# Patient Record
Sex: FEMALE | Race: WHITE | Employment: OTHER | URBAN - METROPOLITAN AREA
[De-identification: names, ages, dates, MRNs, and addresses within clinical notes are randomized per-mention and may not be internally consistent; named-entity substitution may affect disease eponyms.]

---

## 2021-10-14 ENCOUNTER — HOSPITAL ENCOUNTER (OUTPATIENT)
Dept: WOUND CARE | Age: 81
Discharge: HOME | End: 2021-10-14
Attending: PODIATRIST
Payer: MEDICARE

## 2021-10-14 DIAGNOSIS — L08.9 POST-TRAUMATIC WOUND INFECTION: ICD-10-CM

## 2021-10-14 DIAGNOSIS — T14.8XXA POST-TRAUMATIC WOUND INFECTION: ICD-10-CM

## 2021-10-14 DIAGNOSIS — L97.423: Primary | ICD-10-CM

## 2021-10-14 PROCEDURE — 11042 DBRDMT SUBQ TIS 1ST 20SQCM/<: CPT

## 2021-10-14 RX ORDER — LIDOCAINE HYDROCHLORIDE 20 MG/ML
JELLY TOPICAL ONCE
Status: CANCELLED | OUTPATIENT
Start: 2021-10-14 | End: 2021-10-14

## 2021-10-14 RX ORDER — LIDOCAINE HYDROCHLORIDE 20 MG/ML
JELLY TOPICAL ONCE
Status: DISPENSED | OUTPATIENT
Start: 2021-10-14 | End: 2021-10-14

## 2021-10-14 NOTE — PROGRESS NOTES
[address] [facility]  Initial Consult note    Subjective:     Chief Complaint:  Bette Finley is a 79 y.o.  female who presents with Lt. foot dorsal over the 3rd and 4th mets wound of 4 weeks duration. Referred by:  PMJUSTICE    HPI:   Hit the foot on a rocking chair at the [employer] in early september  Wound caused by: acute injury due to trauma   Current wound care:  Offloading wound: yes and n/a  Appetite: good  Wound associated pain: moderate  Diabetic: no  Smoker: no  ROS: no N/V, no T/chills; no local rash  No past medical history on file. No past surgical history on file. No family history on file. Social History     Tobacco Use    Smoking status: Not on file   Substance Use Topics    Alcohol use: Not on file       Prior to Admission medications    Not on File     Not on File     Review of Systems  Gen: No fever, chills, malaise, weight loss/gain. Derm: see above   Musc/skeletal: no bone or joint complains. Vasc: No edema, cyanosis or claudication. Endo: No heat/cold intolerance, no polyuria,polydipsia or polyphagia. HgbA1c:  Advance Care plan:     Counseling re nutritionnot done. Current meds documented in chart  Pain:     Counseling re smoking cessation not done. Blood pressure: noted below     Review of Systems:  Pertinent items are noted in the History of Present Illness. Objective:     Physical Exam:     There were no vitals taken for this visit. General: well developed, well nourished, pleasant , NAD. Hygiene good  Psych: cooperative. Pleasant. No anxiety or depression. Normal mood and affect. Neuro: alert and oriented to person/place/situation. Otherwise nonfocal.  Derm: Skin turgor for age, dry skin  Lower extremities: color normal; temperature normal.Hair growth is not present. Calves are supple, nontender, approximately equally sized in comparison.   Capillary refill <3 sec  Focussed Lower Extremity Exam:  Vascular exam & Nails dystrophic:  Left lower extremity: mild  edema, foot minimal,   DP pulse : 1+  PT pulse: 1+    Ulcer Location: Lt. foot dirsal over the 3 and 4 mets   Etiology: post traumatic  Wound Length:    Wound Width :   Wound Depth :   Ulcer bed: Mixed Granular/Fibrotic  Necrotic eschar  eschar   Hematoma  Perilesional and in the wound bed from trauma    Periwound: Indurated   Exudate: Scant/small amount Serosanguinous exudate  Depth of Wound: To Muscle      Data Review:   No results found for this or any previous visit (from the past 24 hour(s)). Assessment:   There is no problem list on file for this patient. 79 y.o. female with post traumatic wound left foot    Needs :  Serial debridement- debrided today- see note below  Good local wound care  Edema management  Nutrition optimization  Good Diabetic control    Plan:     In wound care clinic today:  Cleanse wound with NS or soap and water or commercial wound cleanser  Apply the following topically to wound bed:  Apply the following to noé-wound: NA  Apply the following dressings: Absorptive dressing    For Home Care/Self Care:  Cleanse wound with NS or soap and water or commercial wound cleanser  Keep dressing dry and intact when bathing  Apply the following to wound bed:  Apply the following to skin around wound: NA  Apply the following dressings: Absorptive dressing    Leave dressings in place until next visit    Edema Control:   Elevate legs as much as possible. Avoid standing in one position for more than 10 minutes. Avoid setting with legs down. Do not cross legs while sitting. Off-Loading:     Frequent position changes. Do not cross legs while sitting. Shift weight every 20 minutes or more when sitting for prolonged periods of time. Patient to return for wound care in:   Days  Follow up with Nurse visit as recommended. PLEASE CONTACT OFFICE AS SOON AS POSSIBLE IF UNABLE TO MAKE THIS APPOINTMENT.  Inspect your wounds, looking for signs of infection which may include the following:  Increase in redness  Red \"streaks\" from wound  Increase in swelling  Fever  Unusual odor  Change in the amount of wound drainage. Should you experience any significant changes in your wound(s) or have any questions regarding your home care instructions please contact the wound center or your home health company. If after regular business hours, please call your family doctor or local emergency room. Patient understood and agrees with plan. Questions answered. Signed By: Meena Donaldson DPM     October 14, 2021           Debridement Wound Care        Problem List Items Addressed This Visit     None          No orders of the defined types were placed in this encounter. Procedure Note  Indications:  Based on my examination of this patient's wound(s)/ulcer(s) today, debridement is required to promote healing and evaluate the wound base.     Performed by: Meena Donaldson DPM    Consent obtained: Yes    Time out taken: Yes    Debridement: Excisional    Using  Curette and pressure irrigation with peroxide in the wound bed follwed with NS the wound(s)/ulcer(s) was/were sharply debrided down through and including the removal of    epidermis, dermis, subcutaneous tissue and muscle/fascia    Devitalized Tissue Debrided: fibrin, biofilm, slough, necrotic/eschar and clots    Pre Debridement Measurements:  Are located in the [place] [facility]  Documentation Flow Sheet    Wound/Ulcer #: 1    Post Debridement Measurements:  Wound/Ulcer Descriptions are Pre Debridement except measurements: Other: post traumatic    Wound Foot Left;Dorsal 10/14/21 (Active)   Wound Image   10/14/21 0931   Wound Etiology Traumatic 10/14/21 0931   Dressing Status Intact 10/14/21 0931   Cleansed Wound cleanser 10/14/21 0931   Wound Length (cm) 0.9 cm 10/14/21 0931   Wound Width (cm) 0.6 cm 10/14/21 0931   Wound Depth (cm) 0.7 cm 10/14/21 0931   Wound Surface Area (cm^2) 0.54 cm^2 10/14/21 0931   Wound Volume (cm^3) 0.378 cm^3 10/14/21 0931   Drainage Amount Scant 10/14/21 0931   Wound Odor None 10/14/21 0931   Lashell-Wound/Incision Assessment Dry/flaky 10/14/21 0931   Edges Defined edges 10/14/21 0931   Wound Thickness Description Full thickness 10/14/21 0931   Number of days: 0        Percent of Wound(s)/Ulcer(s) Debrided: 100%    Total Surface Area Debrided:  Approx 1 sq cm See RN's note    Diabetic/Pressure/Non Pressure Ulcers only: Other: post traumatic  Ulcer:     Estimated Blood Loss:  Estimated amt of blood loss is 10 ml    Hemostasis Achieved: Pressure    Procedural Pain: 4 / 10     Post Procedural Pain: 0 / 10     Response to treatment: Patient tolerated treatment with mild discomfort but relieved after procedure was completed

## 2021-10-14 NOTE — WOUND CARE
10/14/21 0931   Wound Foot Left;Dorsal 10/14/21   Date First Assessed/Time First Assessed: 10/14/21 0930   Present on Hospital Admission: Yes  Wound Approximate Age at First Assessment (Weeks): 5 weeks  Primary Wound Type: Traumatic  Location: Foot  Wound Location Orientation: Left;Dorsal  Date of Fi. ..    Wound Image    Wound Etiology Traumatic   Dressing Status Intact   Cleansed Wound cleanser   Dressing/Treatment Silicone border  (iodoform packing)   Dressing Change Due 10/19/21   Wound Length (cm) 0.9 cm   Wound Width (cm) 0.6 cm   Wound Depth (cm) 0.7 cm   Wound Surface Area (cm^2) 0.54 cm^2   Wound Volume (cm^3) 0.378 cm^3   Post-Procedure Length (cm) 1 cm   Post-Procedure Width (cm) 0.6 cm   Post-Procedure Depth (cm) 0.9 cm   Post-Procedure Surface Area (cm^2) 0.6 cm^2   Post-Procedure Volume (cm^3) 0.54 cm^3   Wound Assessment Purple/maroon;Ruptured blister   Drainage Amount Scant   Drainage Description Other (Comment)  (clotted blood)   Wound Odor None   Lashell-Wound/Incision Assessment Dry/flaky   Edges Defined edges   Wound Thickness Description Full thickness     Post debridement picture:

## 2021-10-19 ENCOUNTER — HOSPITAL ENCOUNTER (OUTPATIENT)
Dept: WOUND CARE | Age: 81
Discharge: HOME | End: 2021-10-19
Attending: PODIATRIST
Payer: MEDICARE

## 2021-10-19 DIAGNOSIS — L97.423: Primary | ICD-10-CM

## 2021-10-19 DIAGNOSIS — L08.9 POST-TRAUMATIC WOUND INFECTION: ICD-10-CM

## 2021-10-19 DIAGNOSIS — T14.8XXA POST-TRAUMATIC WOUND INFECTION: ICD-10-CM

## 2021-10-19 PROCEDURE — 11042 DBRDMT SUBQ TIS 1ST 20SQCM/<: CPT

## 2021-10-19 RX ORDER — CEPHALEXIN 500 MG/1
500 CAPSULE ORAL 4 TIMES DAILY
Qty: 56 CAPSULE | Refills: 0 | Status: SHIPPED | OUTPATIENT
Start: 2021-10-19 | End: 2021-11-02

## 2021-10-19 RX ORDER — LIDOCAINE HYDROCHLORIDE 20 MG/ML
JELLY TOPICAL ONCE
Status: DISCONTINUED | OUTPATIENT
Start: 2021-10-19 | End: 2021-10-20 | Stop reason: HOSPADM

## 2021-10-19 RX ORDER — LIDOCAINE HYDROCHLORIDE 20 MG/ML
JELLY TOPICAL ONCE
Status: CANCELLED | OUTPATIENT
Start: 2021-10-19 | End: 2021-10-19

## 2021-10-19 NOTE — PROGRESS NOTES
Debridement Wound Care        Problem List Items Addressed This Visit        Other    Ulcer of heel and midfoot, left, with necrosis of muscle ([address] - Primary    Relevant Medications    cephALEXin (KEFLEX) 500 mg capsule    lidocaine (URO-JET/ GLYDO) 2 % jelly (Start on 10/19/2021  5:00 PM)    Other Relevant Orders    INITIATE OUTPATIENT WOUND CARE PROTOCOL    Post-traumatic wound infection    Relevant Medications    cephALEXin (KEFLEX) 500 mg capsule    lidocaine (URO-JET/ GLYDO) 2 % jelly (Start on 10/19/2021  5:00 PM)    Other Relevant Orders    INITIATE OUTPATIENT WOUND CARE PROTOCOL          Medications Ordered Today   Medications    cephALEXin (KEFLEX) 500 mg capsule     Sig: Take 1 Capsule by mouth four (4) times daily for 56 doses. Indications: an infection of the skin and the tissue below the skin     Dispense:  56 Capsule     Refill:  0    lidocaine (URO-JET/ GLYDO) 2 % jelly        Procedure Note  Indications:  Based on my examination of this patient's wound(s)/ulcer(s) today, debridement is required to promote healing and evaluate the wound base.     Performed by: Suleiman Lee DPM    Consent obtained: Yes    Time out taken: Yes    Debridement: Excisional    Using  Curette the wound(s)/ulcer(s) was/were sharply debrided down through and including the removal of    epidermis, dermis, subcutaneous tissue and muscle/fascia    Devitalized Tissue Debrided: fibrin, biofilm and slough    Pre Debridement Measurements:  Are located in the [facility]  Documentation Flow Sheet    Wound/Ulcer #: 1    Post Debridement Measurements:  Wound/Ulcer Descriptions are Pre Debridement except measurements: Other: post traumatic ulcer left foot    Wound Foot Left;Dorsal 10/14/21 (Active)   Wound Image   10/14/21 0931   Wound Etiology Traumatic 10/14/21 0931   Dressing Status Intact 10/14/21 0931   Cleansed Wound cleanser 10/14/21 0931   Dressing/Treatment Silicone border 33/59/19 0931   Dressing Change Due 10/19/21 10/14/21 0931   Wound Length (cm) 0.9 cm 10/14/21 0931   Wound Width (cm) 0.6 cm 10/14/21 0931   Wound Depth (cm) 0.7 cm 10/14/21 0931   Wound Surface Area (cm^2) 0.54 cm^2 10/14/21 0931   Wound Volume (cm^3) 0.378 cm^3 10/14/21 0931   Post-Procedure Length (cm) 1 cm 10/14/21 0931   Post-Procedure Width (cm) 0.6 cm 10/14/21 0931   Post-Procedure Depth (cm) 0.9 cm 10/14/21 0931   Post-Procedure Surface Area (cm^2) 0.6 cm^2 10/14/21 0931   Post-Procedure Volume (cm^3) 0.54 cm^3 10/14/21 0931   Wound Assessment Purple/maroon;Ruptured blister 10/14/21 0931   Drainage Amount Scant 10/14/21 0931   Drainage Description Other (Comment) 10/14/21 0931   Wound Odor None 10/14/21 0931   Lashell-Wound/Incision Assessment Dry/flaky 10/14/21 0931   Edges Defined edges 10/14/21 0931   Wound Thickness Description Full thickness 10/14/21 0931   Number of days: 5        Percent of Wound(s)/Ulcer(s) Debrided: 100%    Total Surface Area Debrided:  Approx 2 sq cm See RN's note    Diabetic/Pressure/Non Pressure Ulcers only: Other: post traumatic   Ulcer:     Estimated Blood Loss:  Minimal     Hemostasis Achieved: Pressure    Procedural Pain: 3 / 10     Post Procedural Pain: 0 / 10     Response to treatment: Patient tolerated treatment with mild discomfort but relieved after procedure was completed   Prescribed keflex for 14 days due to localized erythema

## 2021-10-20 VITALS
DIASTOLIC BLOOD PRESSURE: 59 MMHG | HEART RATE: 83 BPM | RESPIRATION RATE: 18 BRPM | TEMPERATURE: 97.9 F | OXYGEN SATURATION: 96 % | SYSTOLIC BLOOD PRESSURE: 166 MMHG

## 2021-10-28 VITALS
HEART RATE: 83 BPM | RESPIRATION RATE: 18 BRPM | OXYGEN SATURATION: 97 % | SYSTOLIC BLOOD PRESSURE: 165 MMHG | DIASTOLIC BLOOD PRESSURE: 60 MMHG | TEMPERATURE: 97.3 F

## 2021-10-29 NOTE — WOUND CARE
10/19/21 1500   Wound Foot Left;Dorsal 10/14/21   Date First Assessed/Time First Assessed: 10/14/21 0930   Present on Hospital Admission: Yes  Wound Approximate Age at First Assessment (Weeks): 5 weeks  Primary Wound Type: Traumatic  Location: Foot  Wound Location Orientation: Left;Dorsal  Date of Fi. ..    Wound Image    Wound Etiology Traumatic   Dressing Status Intact   Cleansed Wound cleanser   Dressing/Treatment Collagen with Ag;Silicone border   Dressing Change Due 11/02/21   Wound Length (cm) 0.9 cm   Wound Width (cm) 0.5 cm   Wound Depth (cm) 0.8 cm   Wound Surface Area (cm^2) 0.45 cm^2   Change in Wound Size % 16.67   Wound Volume (cm^3) 0.36 cm^3   Wound Healing % 5   Post-Procedure Length (cm) 0.9 cm   Post-Procedure Width (cm) 0.5 cm   Post-Procedure Depth (cm) 0.9 cm   Post-Procedure Surface Area (cm^2) 0.45 cm^2   Post-Procedure Volume (cm^3) 0.405 cm^3   Wound Assessment Pink/red   Drainage Amount Small   Drainage Description Other (Comment)   Wound Odor None   Lashell-Wound/Incision Assessment Intact   Edges Defined edges   Wound Thickness Description Full thickness     Post debridement picture:

## 2021-11-02 ENCOUNTER — HOSPITAL ENCOUNTER (OUTPATIENT)
Dept: WOUND CARE | Age: 81
Discharge: HOME | End: 2021-11-02
Attending: PODIATRIST
Payer: MEDICARE

## 2021-11-02 DIAGNOSIS — L08.9 POST-TRAUMATIC WOUND INFECTION: ICD-10-CM

## 2021-11-02 DIAGNOSIS — L97.423: Primary | ICD-10-CM

## 2021-11-02 DIAGNOSIS — T14.8XXA POST-TRAUMATIC WOUND INFECTION: ICD-10-CM

## 2021-11-02 PROCEDURE — 11042 DBRDMT SUBQ TIS 1ST 20SQCM/<: CPT

## 2021-11-02 RX ORDER — LIDOCAINE HYDROCHLORIDE 20 MG/ML
JELLY TOPICAL ONCE
Status: CANCELLED | OUTPATIENT
Start: 2021-11-02 | End: 2021-11-02

## 2021-11-02 RX ORDER — LIDOCAINE HYDROCHLORIDE 20 MG/ML
JELLY TOPICAL AS NEEDED
Status: DISCONTINUED | OUTPATIENT
Start: 2021-11-02 | End: 2021-11-04 | Stop reason: HOSPADM

## 2021-11-02 RX ADMIN — LIDOCAINE HYDROCHLORIDE: 20 JELLY TOPICAL at 14:21

## 2021-11-02 NOTE — PROGRESS NOTES
Debridement Wound Care        Problem List Items Addressed This Visit        Other    Ulcer of heel and midfoot, left, with necrosis of muscle ([address] - Primary    Relevant Orders    INITIATE OUTPATIENT WOUND CARE PROTOCOL    Post-traumatic wound infection    Relevant Orders    INITIATE OUTPATIENT WOUND CARE PROTOCOL          Medications Ordered Today   Medications    lidocaine (XYLOCAINE) 2 % jelly        Procedure Note  Indications:  Based on my examination of this patient's wound(s)/ulcer(s) today, debridement is required to promote healing and evaluate the wound base.     Performed by: Ludmila Chambers DPM    Consent obtained: Yes    Time out taken: Yes    Debridement: Excisional    Using  Curette the wound(s)/ulcer(s) was/were sharply debrided down through and including the removal of    epidermis, dermis and subcutaneous tissue    Devitalized Tissue Debrided: fibrin, biofilm, slough and clots    Pre Debridement Measurements:  Are located in the [place]  Documentation Flow Sheet    Wound/Ulcer #: 1    Post Debridement Measurements:  Wound/Ulcer Descriptions are Pre Debridement except measurements: Other: post traumatic     Wound Foot Left;Dorsal 10/14/21 (Active)   Wound Image   11/02/21 1352   Wound Etiology Traumatic 11/02/21 1352   Dressing Status Intact 11/02/21 1352   Cleansed Wound cleanser 11/02/21 1352   Dressing/Treatment Alginate with Ag;Collagen with Ag;Silicone border 18/39/91 1352   Dressing Change Due 11/09/21 11/02/21 1352   Wound Length (cm) 0.6 cm 11/02/21 1352   Wound Width (cm) 0.5 cm 11/02/21 1352   Wound Depth (cm) 0.2 cm 11/02/21 1352   Wound Surface Area (cm^2) 0.3 cm^2 11/02/21 1352   Change in Wound Size % 44.44 11/02/21 1352   Wound Volume (cm^3) 0.06 cm^3 11/02/21 1352   Wound Healing % 84 11/02/21 1352   Post-Procedure Length (cm) 0.9 cm 10/19/21 1500   Post-Procedure Width (cm) 0.5 cm 10/19/21 1500   Post-Procedure Depth (cm) 0.9 cm 10/19/21 1500   Post-Procedure Surface Area (cm^2) 0.45 cm^2 10/19/21 1500   Post-Procedure Volume (cm^3) 0.405 cm^3 10/19/21 1500   Wound Assessment Fibrin;Pink/red 11/02/21 1352   Drainage Amount Scant 11/02/21 1352   Drainage Description Other (Comment) 10/19/21 1500   Wound Odor None 11/02/21 1352   Lashell-Wound/Incision Assessment Intact 11/02/21 1352   Edges Defined edges 11/02/21 1352   Wound Thickness Description Full thickness 11/02/21 1352   Number of days: 19        Percent of Wound(s)/Ulcer(s) Debrided: 100%    Total Surface Area Debrided:  Approx 0.5 sq cm See RN's note    Diabetic/Pressure/Non Pressure Ulcers only: Other: post traumatic   Ulcer:     Estimated Blood Loss:  Minimal     Hemostasis Achieved: Pressure    Procedural Pain: 0 / 10     Post Procedural Pain: 0 / 10     Response to treatment: Well tolerated by patient

## 2021-11-05 VITALS
RESPIRATION RATE: 18 BRPM | SYSTOLIC BLOOD PRESSURE: 137 MMHG | OXYGEN SATURATION: 100 % | HEART RATE: 72 BPM | DIASTOLIC BLOOD PRESSURE: 50 MMHG | TEMPERATURE: 97.7 F

## 2021-11-05 NOTE — WOUND CARE
11/02/21 1352 Wound Foot Left;Dorsal 10/14/21 Date First Assessed/Time First Assessed: 10/14/21 0930   Present on Hospital Admission: Yes  Wound Approximate Age at First Assessment (Weeks): 5 weeks  Primary Wound Type: Traumatic  Location: Foot  Wound Location Orientation: Left;Dorsal  Date of Fi. .. Wound Image Wound Etiology Traumatic Dressing Status Intact Cleansed Wound cleanser Dressing/Treatment Alginate with Ag; Collagen with Ag; Silicone border Dressing Change Due 11/09/21 Wound Length (cm) 0.6 cm Wound Width (cm) 0.5 cm Wound Depth (cm) 0.2 cm Wound Surface Area (cm^2) 0.3 cm^2 Change in Wound Size % 44.44 Wound Volume (cm^3) 0.06 cm^3 Wound Healing % 84 Post-Procedure Length (cm) 0.7 cm Post-Procedure Width (cm) 0.6 cm Post-Procedure Depth (cm) 0.3 cm Post-Procedure Surface Area (cm^2) 0.42 cm^2 Post-Procedure Volume (cm^3) 0.126 cm^3 Wound Assessment Fibrin; Pink/red Drainage Amount Scant Wound Odor None Lashell-Wound/Incision Assessment Intact Edges Defined edges Wound Thickness Description Full thickness

## 2021-11-05 NOTE — DISCHARGE INSTRUCTIONS
Discharge Instructions for  [address]  [address], [place], [facility]0 [facility]  [phone] Fax [phone]    NAME:  Mariam Etienne OF BIRTH:  1940  MEDICAL RECORD NUMBER:  [id number]  DATE:  11/2/2021    Wound Cleansing:   Do not scrub or use excessive force. Cleanse wound prior to applying a clean dressing with:  [] Normal Saline [] Keep Wound Dry in Shower    [x] Wound Cleanser   [] Cleanse wound with Mild Soap & Water  [] May Shower at Discharge   [] Other:      Topical Treatments:  Do not apply lotions, creams, or ointments to wound bed unless directed. [] Apply moisturizing lotion to skin surrounding the wound prior to dressing change.  [] Apply antifungal ointment to skin surrounding the wound prior to dressing change.  [] Apply thin film of moisture barrier ointment to skin immediately around wound. [] Other:       Dressings:           Wound Location ***   [x] Apply Primary Dressing:       [] MediHoney Gel [] Alginate with Silver [] Alginate   [] Collagen [x] Collagen with Silver   [] Santyl with Moisten saline gauze     [] Hydrocolloid   [] MediHoney Alginate [] Foam with Silver   [] Foam   [] Hydrofera Blue    [] Mepilex Border    [] Moisten with Saline [] Hydrogel [x] Mepitel one     [] Bactroban/Mupirocin [] Polysporin  [] Other:    [] Pack wound loosely with  [] Iodoform   [] Plain Packing  [] Other   [x] Cover and Secure with:     [] Gauze [] Denis [] Kerlix   [] Ace Wrap [] Cover Roll Tape [] ABD     [x] Other: Tubular stocking   Avoid contact of tape with skin.   [x] Change dressing: [] Daily    [] Every Other Day [] Three times per week   [] Once a week [] Do Not Change Dressing   [x] Other:PRN   Negative Pressure:           Wound Location:   [] Pressure@           mm/Hg  []Continuous []Intermittent   [] Black  [] White Foam [] Other:   []Change dressing: []Three times per week    []Other:     Pressure Relief:  [x] When sitting, shift position or do seat lifts every 15 minutes.  [] Wheelchair cushion [] Specialty Bed/Mattress  [] Turn every 2 hours when in bed. Avoid position directing pressure on wound site. Limit side lying to 30 degree tilt. Limit HOB elevation to 30 degrees. Edema Control:  Apply: [] Compression Stocking []Right Leg []Left Leg   [] Tubigrip []Right Leg Double Layer []Left Leg Double Layer       []Right Leg Single Layer []Left Leg Single Layer   [] SpandaGrip []Right Leg  []Left Leg      []Low compression 5-10 mm/Hg      []Medium compression 10-20 mm/Hg     []High compression  20-30 mm/Hg   every morning immediately when getting up should be applied to affected leg(s) from mid foot to knee making sure to cover the heel. Remove every night before going to bed. [x] Elevate leg(s) above the level of the heart when sitting. [x] Avoid prolonged standing in one place. [] Elevate arm/hand above the level of the heart []RightArm []LeftArm     Compression:  Apply: [] Multilayer Compression Wrap Applied in Clinic []RightLeg []Left Leg   [] Multi-layer compression. Do not get leg(s) with wrap wet. If wraps become too tight call the center or completely remove the wrap. [] Elevate leg(s) above the level of the heart when sitting. [] Avoid prolonged standing in one place. Off-Loading:   [] Off-loading when [] walking  [] in bed [] sitting  [] Total non-weight bearing  [] Right Leg  [] Left Leg   [] Assistive Device [] Walker [] Cane  [] Wheelchair  [] Crutches   [] Surgical shoe    [] Podus Boot(s)   [] Foam Boot(s)  [] Roll About    [] Cast Boot [] CROW Boot  [] Other:    Contact Cast:  Apply: [] Total Contact Cast Applied in Clinic []RightLeg []Left Leg   [] Do not get cast wet. Contact center or go to emergency room if there is a foul odor or becomes uncomfortable due to feeling tight or swelling. Do not use objects inside of cast to scratch.       Dietary:  [] Diet as tolerated: [] Calorie Diabetic Diet: [] No Added Salt:  [x] Increase Protein: [] Other:   Activity:  [x] Activity as tolerated:  [] Patient has no activity restrictions     [] Strict Bedrest: [] Remain off Work:     [] May return to full duty work:                                   [] Return to work with restrictions:             Return Appointment:  [] Wound and dressing supply provider:   [] ECF or Home Healthcare:  [x] Wound Assessment: [x] Physician or NP scheduled for Wound Assessment:   [x] Return Appointment: 1 Week(s)  [] Ordered tests:      Electronically signed Elsie Garcia LPN on [id number]/8/3723 at 5:04 AM     [address]: Should you experience any significant changes in your wound(s) or have questions about your wound care, please contact the [facility] at [address] [place] [address] 8:00 am - 4:30. If you need help with your wound outside these hours and cannot wait until we are again available, contact your PCP or go to the hospital emergency room. PLEASE NOTE: IF YOU ARE UNABLE TO OBTAIN WOUND SUPPLIES, CONTINUE TO USE THE SUPPLIES YOU HAVE AVAILABLE UNTIL YOU ARE ABLE TO REACH US. IT IS MOST IMPORTANT TO KEEP THE WOUND COVERED AT ALL TIMES.      Physician Signature:_______________________    Date: ___________ Time:  ____________

## 2021-11-09 ENCOUNTER — HOSPITAL ENCOUNTER (OUTPATIENT)
Dept: WOUND CARE | Age: 81
Discharge: HOME | End: 2021-11-09
Attending: PODIATRIST
Payer: MEDICARE

## 2021-11-09 VITALS
HEART RATE: 75 BPM | TEMPERATURE: 98.7 F | OXYGEN SATURATION: 100 % | SYSTOLIC BLOOD PRESSURE: 129 MMHG | DIASTOLIC BLOOD PRESSURE: 55 MMHG | RESPIRATION RATE: 16 BRPM

## 2021-11-09 DIAGNOSIS — L08.9 POST-TRAUMATIC WOUND INFECTION: ICD-10-CM

## 2021-11-09 DIAGNOSIS — L97.423: Primary | ICD-10-CM

## 2021-11-09 DIAGNOSIS — T14.8XXA POST-TRAUMATIC WOUND INFECTION: ICD-10-CM

## 2021-11-09 PROCEDURE — 99211 OFF/OP EST MAY X REQ PHY/QHP: CPT

## 2021-11-09 RX ORDER — LIDOCAINE HYDROCHLORIDE 20 MG/ML
JELLY TOPICAL ONCE
Status: COMPLETED | OUTPATIENT
Start: 2021-11-09 | End: 2021-11-09

## 2021-11-09 RX ORDER — LIDOCAINE HYDROCHLORIDE 20 MG/ML
JELLY TOPICAL ONCE
OUTPATIENT
Start: 2021-11-09 | End: 2021-11-09

## 2021-11-09 RX ADMIN — LIDOCAINE HYDROCHLORIDE: 20 JELLY TOPICAL at 13:49

## 2021-11-09 NOTE — PROGRESS NOTES
[address]   Progress Note and Procedure Note   NO Procedure      Dayna العلي  MEDICAL RECORD NUMBER:  [id number]  AGE: [de-identified] y.o. RACE WHITE/NON-  GENDER: female  : 1940  EPISODE DATE:  2021    Subjective:     Chief Complaint   Patient presents with    Wound Check         HISTORY of PRESENT ILLNESS HPI    [email or web address] is a [de-identified] y.o. female who presents today for wound/ulcer evaluation. History of Wound Context: post traumatic wound left foot  Wound/Ulcer Pain Timing/Severity: none  Quality of pain:   Severity:  0 / 10   Modifying Factors: None  Associated Signs/Symptoms: none    Ulcer Identification:  Ulcer Type: traumatic    Contributing Factors: edema    Wound: 100% healed         PAST MEDICAL HISTORY    No past medical history on file. PAST SURGICAL HISTORY    No past surgical history on file. FAMILY HISTORY    No family history on file. SOCIAL HISTORY    Social History     Tobacco Use    Smoking status: Not on file    Smokeless tobacco: Not on file   Substance Use Topics    Alcohol use: Not on file    Drug use: Not on file       ALLERGIES    Not on File    MEDICATIONS    No current outpatient medications on file prior to encounter. No current facility-administered medications on file prior to encounter. REVIEW OF SYSTEMS    Pertinent items are noted in HPI. Objective:     Visit Vitals  BP (!) 129/55 (BP 1 Location: Left upper arm, BP Patient Position: At rest;Sitting)   Pulse 75   Temp 98.7 °F (37.1 °C)   Resp 16   SpO2 100%       Wt Readings from Last 3 Encounters:   No data found for Wt       PHYSICAL EXAM    Pertinent items are noted in HPI.     Assessment:      Problem List Items Addressed This Visit        Other    Ulcer of heel and midfoot, left, with necrosis of muscle (HCC) - Primary    Relevant Medications    lidocaine (URO-JET/ GLYDO) 2 % jelly (Completed)    Other Relevant Orders    INITIATE OUTPATIENT WOUND CARE PROTOCOL    Post-traumatic wound infection    Relevant Medications    lidocaine (URO-JET/ GLYDO) 2 % jelly (Completed)    Other Relevant Orders    INITIATE OUTPATIENT WOUND CARE PROTOCOL          Procedure Note  Indications:  Based on my examination of this patient's wound(s)/ulcer(s) today, debridement is not required to promote healing and evaluate the wound base. Wound Foot Left;Dorsal 10/14/21 (Active)   Wound Image    11/02/21 1352   Wound Etiology Traumatic 11/02/21 1352   Dressing Status Intact 11/02/21 1352   Cleansed Wound cleanser 11/02/21 1352   Dressing/Treatment Alginate with Ag; Collagen with Ag; Silicone border 92/70/04 1352   Dressing Change Due 11/09/21 11/02/21 1352   Wound Length (cm) 0.6 cm 11/02/21 1352   Wound Width (cm) 0.5 cm 11/02/21 1352   Wound Depth (cm) 0.2 cm 11/02/21 1352   Wound Surface Area (cm^2) 0.3 cm^2 11/02/21 1352   Change in Wound Size % 44.44 11/02/21 1352   Wound Volume (cm^3) 0.06 cm^3 11/02/21 1352   Wound Healing % 84 11/02/21 1352   Post-Procedure Length (cm) 0.7 cm 11/02/21 1352   Post-Procedure Width (cm) 0.6 cm 11/02/21 1352   Post-Procedure Depth (cm) 0.3 cm 11/02/21 1352   Post-Procedure Surface Area (cm^2) 0.42 cm^2 11/02/21 1352   Post-Procedure Volume (cm^3) 0.126 cm^3 11/02/21 1352   Wound Assessment Fibrin; Pink/red 11/02/21 1352   Drainage Amount Scant 11/02/21 1352   Drainage Description Other (Comment) 10/19/21 1500   Wound Odor None 11/02/21 1352   [place]-Wound/Incision Assessment Intact 11/02/21 1352   Edges Defined edges 11/02/21 1352   Wound Thickness Description Full thickness 11/02/21 1352   Number of days: 26        Plan:   Discharged from care     Treatment Note please see attached Discharge Instructions    Written patient dismissal instructions given to patient or POA.          Electronically signed by Dewaine Favre, DPM on 11/9/2021 at 3:24 PM

## 2021-11-10 NOTE — WOUND CARE
11/09/21 1330   Wound Foot Left;Dorsal 10/14/21   Date First Assessed/Time First Assessed: 10/14/21 0930   Present on Hospital Admission: Yes  Wound Approximate Age at First Assessment (Weeks): 5 weeks  Primary Wound Type: Traumatic  Location: Foot  Wound Location Orientation: Left;Dorsal  Date of Fi. ..    Wound Image    Wound Etiology Traumatic   Dressing Status Intact   Cleansed Wound cleanser   Dressing/Treatment Silicone border   Wound Length (cm) 0 cm  (wound is clinically closed)   Wound Width (cm) 0 cm   Wound Depth (cm) 0 cm   Wound Surface Area (cm^2) 0 cm^2   Change in Wound Size % 100   Wound Volume (cm^3) 0 cm^3   Wound Healing % 100   Wound Assessment Epithelialization; Pink/red

## 2022-03-18 PROBLEM — L97.423: Status: ACTIVE | Noted: 2021-10-14

## 2022-03-18 PROBLEM — L08.9 POST-TRAUMATIC WOUND INFECTION: Status: ACTIVE | Noted: 2021-10-14

## 2022-03-18 PROBLEM — T14.8XXA POST-TRAUMATIC WOUND INFECTION: Status: ACTIVE | Noted: 2021-10-14

## 2022-10-27 ENCOUNTER — TRANSCRIBE ORDER (OUTPATIENT)
Dept: SCHEDULING | Age: 82
End: 2022-10-27

## 2022-10-27 DIAGNOSIS — R93.2 ABNORMAL LIVER CT: Primary | ICD-10-CM

## 2022-11-28 ENCOUNTER — HOSPITAL ENCOUNTER (OUTPATIENT)
Dept: ULTRASOUND IMAGING | Age: 82
Discharge: HOME | End: 2022-11-28
Attending: INTERNAL MEDICINE
Payer: MEDICARE

## 2022-11-28 DIAGNOSIS — R93.2 ABNORMAL LIVER CT: ICD-10-CM

## 2022-11-28 PROCEDURE — 76981 USE PARENCHYMA: CPT

## 2025-01-22 ENCOUNTER — TRANSCRIBE ORDERS (OUTPATIENT)
Facility: HOSPITAL | Age: 85
End: 2025-01-22

## 2025-01-22 ENCOUNTER — HOSPITAL ENCOUNTER (OUTPATIENT)
Facility: HOSPITAL | Age: 85
Discharge: HOME | End: 2025-01-25
Payer: MEDICARE

## 2025-01-22 DIAGNOSIS — Z01.812 PRE-OPERATIVE LABORATORY EXAMINATION: Primary | ICD-10-CM

## 2025-01-22 DIAGNOSIS — Z01.812 PRE-OPERATIVE LABORATORY EXAMINATION: ICD-10-CM

## 2025-01-22 DIAGNOSIS — M25.561 ACUTE PAIN OF RIGHT KNEE: ICD-10-CM

## 2025-01-22 LAB
ALBUMIN SERPL-MCNC: 3.3 G/DL (ref 3.4–5)
ALBUMIN/GLOB SERPL: 1.1 (ref 0.8–1.7)
ALP SERPL-CCNC: 221 U/L (ref 45–117)
ALT SERPL-CCNC: 37 U/L (ref 13–56)
ANION GAP SERPL CALC-SCNC: 2 MMOL/L (ref 3–18)
AST SERPL-CCNC: 56 U/L (ref 10–38)
BASOPHILS # BLD: 0.01 K/UL (ref 0–0.1)
BASOPHILS NFR BLD: 0.3 % (ref 0–2)
BILIRUB SERPL-MCNC: 1.1 MG/DL (ref 0.2–1)
BUN SERPL-MCNC: 37 MG/DL (ref 7–18)
BUN/CREAT SERPL: 28 (ref 12–20)
CALCIUM SERPL-MCNC: 9.6 MG/DL (ref 8.5–10.1)
CHLORIDE SERPL-SCNC: 106 MMOL/L (ref 100–111)
CO2 SERPL-SCNC: 34 MMOL/L (ref 21–32)
CREAT SERPL-MCNC: 1.34 MG/DL (ref 0.6–1.3)
DIFFERENTIAL METHOD BLD: ABNORMAL
EOSINOPHIL # BLD: 0.06 K/UL (ref 0–0.4)
EOSINOPHIL NFR BLD: 1.7 % (ref 0–5)
ERYTHROCYTE [DISTWIDTH] IN BLOOD BY AUTOMATED COUNT: 16 % (ref 11.6–14.5)
GLOBULIN SER CALC-MCNC: 3 G/DL (ref 2–4)
GLUCOSE SERPL-MCNC: 105 MG/DL (ref 74–99)
HCT VFR BLD AUTO: 35 % (ref 35–45)
HGB BLD-MCNC: 11.1 G/DL (ref 12–16)
IMM GRANULOCYTES # BLD AUTO: 0.01 K/UL (ref 0–0.04)
IMM GRANULOCYTES NFR BLD AUTO: 0.3 % (ref 0–0.5)
LYMPHOCYTES # BLD: 0.64 K/UL (ref 0.9–3.3)
LYMPHOCYTES NFR BLD: 18 % (ref 21–52)
MCH RBC QN AUTO: 32.6 PG (ref 24–34)
MCHC RBC AUTO-ENTMCNC: 31.7 G/DL (ref 31–37)
MCV RBC AUTO: 102.9 FL (ref 78–100)
MONOCYTES # BLD: 0.6 K/UL (ref 0.05–1.2)
MONOCYTES NFR BLD: 16.9 % (ref 3–10)
NEUTS SEG # BLD: 2.24 K/UL (ref 1.8–8)
NEUTS SEG NFR BLD: 62.8 % (ref 40–73)
NRBC # BLD: 0 K/UL (ref 0–0.01)
NRBC BLD-RTO: 0 PER 100 WBC
PLATELET # BLD AUTO: 82 K/UL (ref 135–420)
PMV BLD AUTO: 11.1 FL (ref 9.2–11.8)
POTASSIUM SERPL-SCNC: 5.1 MMOL/L (ref 3.5–5.5)
PROT SERPL-MCNC: 6.3 G/DL (ref 6.4–8.2)
RBC # BLD AUTO: 3.4 M/UL (ref 4.2–5.3)
SODIUM SERPL-SCNC: 142 MMOL/L (ref 136–145)
WBC # BLD AUTO: 3.6 K/UL (ref 4.6–13.2)

## 2025-01-22 PROCEDURE — 80053 COMPREHEN METABOLIC PANEL: CPT

## 2025-01-22 PROCEDURE — 93005 ELECTROCARDIOGRAM TRACING: CPT

## 2025-01-22 PROCEDURE — 85025 COMPLETE CBC W/AUTO DIFF WBC: CPT

## 2025-01-22 PROCEDURE — 36415 COLL VENOUS BLD VENIPUNCTURE: CPT

## 2025-01-23 LAB
EKG ATRIAL RATE: 68 BPM
EKG DIAGNOSIS: NORMAL
EKG P AXIS: 74 DEGREES
EKG P-R INTERVAL: 178 MS
EKG Q-T INTERVAL: 430 MS
EKG QRS DURATION: 134 MS
EKG QTC CALCULATION (BAZETT): 457 MS
EKG R AXIS: 105 DEGREES
EKG T AXIS: 65 DEGREES
EKG VENTRICULAR RATE: 68 BPM
FAX TO NUMBER: NORMAL
TEST RESULTS FAXED TO: NORMAL